# Patient Record
(demographics unavailable — no encounter records)

---

## 2024-10-15 NOTE — HISTORY OF PRESENT ILLNESS
[de-identified] : Patient presents to hematology for evaluation of hypercoagulable disorder.  Patient presented to [facility] August 8, 2024 after a syncopal episode.  Notes progressive shortness of breath for 3 weeks preceding this episode.  She was found to have bilateral main and segmental PE with right heart strain and treated with 5 hours of alteplase.  There was no evidence of DVT on LE Doppler.  Following this, the patient was transition to [facility] and discharged on this medication. The patient had many episodes of travel but these usually consisted of short flights or car rides.  No one lengthy episode was clearly identifiable in the time leading up to development of thrombosis.  Does not take OCPs.  Workup sent inpatient included:   Protein S 22 (low) LA negative  Protein C 158 (h)  Cardiolipin antibodies negative B2G antibodies negative   Patient has also been diagnosed with uterine fibroids during the course of this episode.  Now that she is on blood thinners, she notes significant menorrhagia with her period.  Est care with heme- remainder of hypercoag w/u incl factor V Leiden, prothrombin gene mutation, Antithrombin III were negative.  Iron studies at that time were normal.  These iron studies were reassessed 2 days ago and now show deficiency with ferritin of 16 and saturation of 15%.

## 2024-10-15 NOTE — HISTORY OF PRESENT ILLNESS
[de-identified] : Patient presents to hematology for evaluation of hypercoagulable disorder.  Patient presented to [facility] August 8, 2024 after a syncopal episode.  Notes progressive shortness of breath for 3 weeks preceding this episode.  She was found to have bilateral main and segmental PE with right heart strain and treated with 5 hours of alteplase.  There was no evidence of DVT on LE Doppler.  Following this, the patient was transition to [facility] and discharged on this medication. The patient had many episodes of travel but these usually consisted of short flights or car rides.  No one lengthy episode was clearly identifiable in the time leading up to development of thrombosis.  Does not take OCPs.  Workup sent inpatient included:   Protein S 22 (low) LA negative  Protein C 158 (h)  Cardiolipin antibodies negative B2G antibodies negative   Patient has also been diagnosed with uterine fibroids during the course of this episode.  Now that she is on blood thinners, she notes significant menorrhagia with her period.  Est care with heme- remainder of hypercoag w/u incl factor V Leiden, prothrombin gene mutation, Antithrombin III were negative.  Iron studies at that time were normal.  These iron studies were reassessed 2 days ago and now show deficiency with ferritin of 16 and saturation of 15%.

## 2024-10-15 NOTE — ASSESSMENT
[FreeTextEntry1] : #PE 41-year-old woman without significant past medical history presenting to hematology for consideration of hypercoagulable disorder.  The patient was admitted for submassive PE in early August 2024.  She travels frequently with longest flight being to [place] in July but other than that cannot identify any provoking factors.  No oral contraceptives.  Hypercoagulable workup is negative.   Currently taking Eliquis and reporting heavy vaginal bleeding with her period.  This is in the context of newly diagnosed fibroids which she would like to have removed when safe to do so.  Plan: - Not entirely clear whether this was provoked vs unprovoked event. Hypecoag w/u is negative. Question of provocation and submassive nature of PE might argue in favor of lifelong AC, but pt currently having MARIKA due to increased bleeding and prefers if possible not to be on medication. All in all, will complete initial 6 mo of therapy and then discuss possibilities incl: stop treatment and check d-dimer, reduce dose, cont full dose   -Agree with plan for removal of fibroids but safer to wait until she has been on anticoagulation for at least a few months prior to stopping for procedure.    #MARIKA- prefers to try oral supplemenation. Prescribed ferrous sulfate 325, will f/u 6 weeks

## 2024-10-15 NOTE — ASSESSMENT
[FreeTextEntry1] : #PE 41-year-old woman without significant past medical history presenting to hematology for consideration of hypercoagulable disorder.  The patient was admitted for submassive PE in early August 2024.  She travels frequently with longest flight being to [place] in July but other than that cannot identify any provoking factors.  No oral contraceptives.  Hypercoagulable workup is negative.   Currently taking Eliquis and reporting heavy vaginal bleeding with her period.  This is in the context of newly diagnosed fibroids which she would like to have removed when safe to do so.  Plan: - Not entirely clear whether this was provoked vs unprovoked event. Hypecoag w/u is negative. Question of provocation and submassive nature of PE might argue in favor of lifelong AC, but pt currently having MARIKA due to increased bleeding and prefers if possible not to be on medication. All in all, will complete initial 6 mo of therapy and then discuss possibilities incl: stop treatment and check d-dimer, reduce dose, cont full dose   -Agree with plan for removal of fibroids but safer to wait until she has been on anticoagulation for at least a few months prior to stopping for procedure.    #MARIKA- prefers to try oral supplemenation. Prescribed ferrous sulfate 325, will f/u 6 weeks

## 2024-12-12 NOTE — HISTORY OF PRESENT ILLNESS
[Preoperative Visit] : for a medical evaluation prior to surgery [Scheduled Procedure ___] : a [unfilled] [Date of Surgery ___] : on [unfilled] [Good] : Good [Fever] : no fever [Chills] : no chills [Fatigue] : no fatigue [Chest Pain] : no chest pain [Cough] : no cough [Dyspnea] : dyspnea [Dysuria] : no dysuria [Urinary Frequency] : no urinary frequency [Nausea] : no nausea [Vomiting] : no vomiting [Diarrhea] : no diarrhea [Abdominal Pain] : no abdominal pain [Easy Bruising] : no easy bruising [Lower Extremity Swelling] : no lower extremity swelling [Poor Exercise Tolerance] : no poor exercise tolerance [Diabetes] : no diabetes [Cardiovascular Disease] : no cardiovascular disease [Pulmonary Disease] : no pulmonary disease [Anti-Platelet Agents] : no anti-platelet agents [Nicotine Dependence] : no nicotine dependence [Alcohol Use] : no  alcohol use [Renal Disease] : no renal disease [GI Disease] : no gastrointestinal disease [Sleep Apnea] : no sleep apnea [Thromboembolic Problems] : thromboembolic problems [Frequent use of NSAIDs] : no use of NSAIDs [Transfusion Reaction] : no transfusion reaction [Impaired Immunity] : no impaired immunity [Steroid Use in Last 6 Months] : no steroid use in the last six months [Frequent Aspirin Use] : no frequent aspirin use [Prior Anesthesia] : No prior anesthesia [Prev Anesthesia Reaction] : no previous anesthesia reaction [Electrocardiogram] : ~T an ECG ~C was performed [Echocardiogram] : ~T an echocardiogram ~C was performed [Metabolic Capacity ___Mets%] : The patient has a metabolic capacity of [unfilled] Mets%  [Fair] : Fair [Anesthesia Reaction] : no anesthesia reaction [Sudden Death] : no sudden death [Clotting Disorder] : no clotting disorder [Bleeding Disorder] : no bleeding disorder [FreeTextEntry1] : 42 yo woman with no significant medical history admitted to the hospital after a syncopal episode. Complained of a month of WATKINS and dizziness. CTA showed bilateral PEs with PA dilation and TTE showed moderate RVD with PASP~61mmHg, no redicual DVT She underwent EKOS implant with successful 7h infusion - no complications occurred Next day echo showed normal RV function and PASP~21mmHg  Pt is now approximately 4 months out, still on Eliquis. Has some noted WATKINS after walking 2 miles and going up 3 flights of stairs but denies any cp or repeat syncopal/near-syncopal episodes. Denies any orthopnea, LE swelling, abdominal bloating.

## 2024-12-12 NOTE — ASSESSMENT
[FreeTextEntry1] : 41F w/ hx of PE (8/2024, on Eliquis) p/f pre-op evaluation for fibroidectomy at the end of January  #Pre-op METS >4 No presence of HF, arrythmias, critical valvulopathies or CAD - Stop Eliquis one day prior to surgery - Resume Eliquis post-op when ok by surgeon with goal minimize duration off of Eliquis - Pt is optimized from a cardiology standpoint for surgery, she is low-risk for an intermediate risk surgery.   #PE No clear provoking factor, following with hematologist. Evidence of borderline PH on 8/2024 ECHO  - Will obtain repeat ECHO when patient returns to clinic in 4-6 months   RTC 4-6 months

## 2024-12-12 NOTE — PHYSICAL EXAM
[General Appearance - Well Developed] : well developed [Normal Appearance] : normal appearance [Well Groomed] : well groomed [General Appearance - Well Nourished] : well nourished [No Deformities] : no deformities [General Appearance - In No Acute Distress] : no acute distress [Normal Conjunctiva] : the conjunctiva exhibited no abnormalities [Eyelids - No Xanthelasma] : the eyelids demonstrated no xanthelasmas [Normal Oral Mucosa] : normal oral mucosa [No Oral Pallor] : no oral pallor [No Oral Cyanosis] : no oral cyanosis [Normal Jugular Venous A Waves Present] : normal jugular venous A waves present [Normal Jugular Venous V Waves Present] : normal jugular venous V waves present [No Jugular Venous Banks A Waves] : no jugular venous banks A waves [Respiration, Rhythm And Depth] : normal respiratory rhythm and effort [Exaggerated Use Of Accessory Muscles For Inspiration] : no accessory muscle use [Auscultation Breath Sounds / Voice Sounds] : lungs were clear to auscultation bilaterally [Heart Rate And Rhythm] : heart rate and rhythm were normal [Heart Sounds] : normal S1 and S2 [Murmurs] : no murmurs present [Abdomen Soft] : soft [Abdomen Tenderness] : non-tender [Abdomen Mass (___ Cm)] : no abdominal mass palpated [Abnormal Walk] : normal gait [Gait - Sufficient For Exercise Testing] : the gait was sufficient for exercise testing [Nail Clubbing] : no clubbing of the fingernails [Cyanosis, Localized] : no localized cyanosis [Petechial Hemorrhages (___cm)] : no petechial hemorrhages [Skin Color & Pigmentation] : normal skin color and pigmentation [] : no rash [No Venous Stasis] : no venous stasis [Skin Lesions] : no skin lesions [No Skin Ulcers] : no skin ulcer [No Xanthoma] : no  xanthoma was observed [Oriented To Time, Place, And Person] : oriented to person, place, and time [Affect] : the affect was normal [Mood] : the mood was normal [No Anxiety] : not feeling anxious

## 2025-02-21 NOTE — HISTORY OF PRESENT ILLNESS
[de-identified] : Encounter conducted via 2 way audio/video telehealth system. Obtained verbal consent for patient to conduct visit in this manner. Pt located at home, physician located at [facility]  Patient presents to hematology for f/u VTE   Patient presented to [facility] August 8, 2024 after a syncopal episode.  Notes progressive shortness of breath for 3 weeks preceding this episode.  She was found to have bilateral main and segmental PE with right heart strain and treated with 5 hours of alteplase.  There was no evidence of DVT on LE Doppler.  Following this, the patient was transition to [facility] and discharged on this medication. The patient had many episodes of travel but these usually consisted of short flights or car rides.  No one lengthy episode was clearly identifiable in the time leading up to development of thrombosis.  Does not take OCPs.  Workup sent inpatient included:   Protein S 22 (low) LA negative  Protein C 158 (h)  Cardiolipin antibodies negative B2G antibodies negative   Patient has also been diagnosed with uterine fibroids during the course of this episode.  Now s/p myomectomy Feb 2025, reports that fibroid had been pressing on IVC and felt to have been possible contributing factor to her VTE.   Est care with heme- remainder of hypercoag w/u incl factor V Leiden, prothrombin gene mutation, Antithrombin III were negative.  Iron studies at that time were normal.  These iron studies were reassessed and showed deficiency with ferritin of 16 and saturation of 15%. Started on oral supplements in Oct, been doing well so far

## 2025-02-21 NOTE — ASSESSMENT
[FreeTextEntry1] : #PE 42-year-old woman without significant past medical history presenting to hematology for consideration of hypercoagulable disorder.  The patient was admitted for submassive PE in early August 2024.  She travels frequently with longest flight being to [place] in July but other than that cannot identify any provoking factors.  No oral contraceptives.  Hypercoagulable workup is negative.   Currently taking Eliquis and reporting heavy vaginal bleeding with her period.  Had recent fibroid surgery in 2/2025, uncomplicated.   Plan: - Not entirely clear whether this was provoked vs unprovoked event. Has large fibroids which may be contributing to venous compression. Now s/p removal. Hypecoag w/u is negative. Question of provocation and submassive nature of PE might argue in favor of lifelong AC.   Patient still wishes to consider whether she is willing to remain on lifelong AC but wants to keep going for now and touch base in 3 mo

## 2025-04-10 NOTE — HISTORY OF PRESENT ILLNESS
[FreeTextEntry1] : 43YO female with history of bilateral PE (CTA showed bilateral PEs with PA dilation and TTE showed moderate RVD with PASP~61mmHg, no residual DVT)  s/p catheter directed thrombolysis, fibroids s/p fibroidectomy, who presents for follow up. 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 Since her fibroid surgery 2 months ago, she has been unable to walk more than 5 blocks and becomes somewhat short of breath. Prior to fibroid surgery, she was walking/running 2 miles. Other than that, she denies any chest pain or palpitations. She says that she has continued eliquis per heme/onc and that they stated that her fibroids could have possibly contributed to her PEs.

## 2025-04-10 NOTE — PHYSICAL EXAM
[No Murmur] : no murmur [Normal] : alert and oriented, normal memory [de-identified] : Normal S1, P2 accentuated

## 2025-04-10 NOTE — PHYSICAL EXAM
[No Murmur] : no murmur [Normal] : alert and oriented, normal memory [de-identified] : Normal S1, P2 accentuated

## 2025-04-10 NOTE — ASSESSMENT
[FreeTextEntry1] : EKG NSR PRWP  41YO female with history of bilateral PE s/p catheter directed thrombolysis, fibroids s/p fibroidectomy, who presents for follow up.  #Bilateral pulmonary embolism  s/p catheter directed thrombolysis Patient currently on Eliquis, had RV dysfunction initially but per TTE in Aug 2024 immediately after therapy, RV dysfunction had resolved and patient had mild pulmonary hypertension.  - Given subtle dyspnea on exertion still, will obtain resting TTE. If patient without complete resolution of her symptoms on next visit in 1 month, will obtain exercise TTE to r/o exercise induced pulmonary hypertension (accentuated P2 on physical exam noted).   - Will give referral to Dr. Ochoa (Vascular Cardiology) to work up May Thurner Syndrome as possible etiology of PEs  - Continue eliquis 5mg BID, appreciate heme/onc involvement in duration of anticoagulation, hypercoag work up has been negative  #Healthcare Maintenance  - Will obtain lipid profile this visit  RTC in 1month

## 2025-04-10 NOTE — HISTORY OF PRESENT ILLNESS
[FreeTextEntry1] : 41YO female with history of bilateral PE (CTA showed bilateral PEs with PA dilation and TTE showed moderate RVD with PASP~61mmHg, no residual DVT)  s/p catheter directed thrombolysis, fibroids s/p fibroidectomy, who presents for follow up. 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 Since her fibroid surgery 2 months ago, she has been unable to walk more than 5 blocks and becomes somewhat short of breath. Prior to fibroid surgery, she was walking/running 2 miles. Other than that, she denies any chest pain or palpitations. She says that she has continued eliquis per heme/onc and that they stated that her fibroids could have possibly contributed to her PEs.

## 2025-04-10 NOTE — ASSESSMENT
[FreeTextEntry1] : EKG NSR PRWP  43YO female with history of bilateral PE s/p catheter directed thrombolysis, fibroids s/p fibroidectomy, who presents for follow up.  #Bilateral pulmonary embolism  s/p catheter directed thrombolysis Patient currently on Eliquis, had RV dysfunction initially but per TTE in Aug 2024 immediately after therapy, RV dysfunction had resolved and patient had mild pulmonary hypertension.  - Given subtle dyspnea on exertion still, will obtain resting TTE. If patient without complete resolution of her symptoms on next visit in 1 month, will obtain exercise TTE to r/o exercise induced pulmonary hypertension (accentuated P2 on physical exam noted).   - Will give referral to Dr. Ochoa (Vascular Cardiology) to work up May Thurner Syndrome as possible etiology of PEs  - Continue eliquis 5mg BID, appreciate heme/onc involvement in duration of anticoagulation, hypercoag work up has been negative  #Healthcare Maintenance  - Will obtain lipid profile this visit  RTC in 1month

## 2025-04-28 NOTE — ASSESSMENT
[FreeTextEntry1] : # Hx of PE and uterine fibroids, evaluation for? iliac vein compression syndrome Clinical status: CEAP 0. Continue Eliquis 5mg BID. FUP with hematology. Will obtain US Venous Reflux study. FUP with test results.

## 2025-04-28 NOTE — REASON FOR VISIT
[Other: ____] : [unfilled] [FreeTextEntry1] : Presents for evaluation for possible May Atkins syndrome due to recent hx of PE. Patient had a PE and is currently on NOAC. During her diagnostic w/u large uterine fibroids were discovered and she underwent surgical removal. Since then, her functional capacity has improved, and she is getting  to her usual normal level. Denies any past Hx of DVT. Also denies any ALICIA, telangiectasia, varicose veins but endorses pain in her legs with long standing which she attributed to her foot deformity.

## 2025-05-09 NOTE — PHYSICAL EXAM
[No Murmur] : no murmur [Normal] : alert and oriented, normal memory [de-identified] : Normal S1, P2 accentuated

## 2025-05-09 NOTE — ASSESSMENT
[FreeTextEntry1] : EKG NSR PRWP   A/P 41YO female with history of bilateral PE s/p catheter directed thrombolysis, fibroids s/p fibroidectomy, who presents for follow up.  #Bilateral pulmonary embolism s/p catheter directed thrombolysis Patient currently on Eliquis, had RV dysfunction initially but per TTE in Aug 2024 immediately after therapy, RV dysfunction had resolved and patient had mild pulmonary hypertension.  - Given ongoing  dyspnea on exertion still, resting TTE. nl,  will obtain exercise TTE to r/o exercise induced pulmonary hypertension (accentuated P2 on physical exam noted). Adjunctive echo required to assess pre and post exertion doppler for RVSP.  - f/u Dr. Ochoa (Vascular Cardiology) to work up May Thurner Syndrome as possible etiology of PEs  - Continue eliquis 5mg BID, appreciate heme/onc involvement in duration of anticoagulation, hypercoag work up has been negative  #Healthcare Maintenance  - Will obtain lipid profile LDL 95 HDL 76

## 2025-05-09 NOTE — HISTORY OF PRESENT ILLNESS
[FreeTextEntry1] : 41YO female with history of bilateral PE (CTA showed bilateral PEs with PA dilation and TTE showed moderate RVD with PASP~61mmHg, no residual DVT) s/p catheter directed thrombolysis, fibroids s/p fibroidectomy, who presents for follow up. 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 Since her fibroid surgery 2 months ago,  exertion on  level ground has improved, but any more strenuous effort (ie climbing stairs in townhouse, recent move houses) limited by shortness of breath. Prior to fibroid surgery, she was walking/running 2 miles. Other than that, she denies any chest pain or palpitations. She says that she has continued eliquis per heme/onc and that they stated that her fibroids could have possibly contributed to her PEs. Vascular  evaluation for? iliac vein compression syndrome

## 2025-05-19 NOTE — REASON FOR VISIT
[FreeTextEntry1] : FUP post US Doppler venous reflux study. Feels well denies CP, SOB, dizziness, syncope, orthopnea, palps. Has no ALICIA or varicosities. US showed deep vein reflux in LLE. She is compliant with NOAC.

## 2025-05-19 NOTE — CARDIOLOGY SUMMARY
[de-identified] : US-Doppler venous reflux: CONCLUSIONS: 1. No evidence of deep or superficial venous thrombosis of visualized veins of bilateral lower extremities. 2. Evidence of venous reflux in left femoral vein, left popliteal vein, and left saphenofemoral junction.

## 2025-05-19 NOTE — ASSESSMENT
[FreeTextEntry1] :  # Left LE deep venous reflux. We discussed further management including MRI-Venogram or conventional venogram/IVUS for diagnosis of iliac compression syndrome. We discussed all possible conservative options e.g. compression stocking and avoid long-time standing and sitting. We will have another appointment after the MRI results are available.